# Patient Record
Sex: FEMALE | Race: WHITE | URBAN - METROPOLITAN AREA
[De-identification: names, ages, dates, MRNs, and addresses within clinical notes are randomized per-mention and may not be internally consistent; named-entity substitution may affect disease eponyms.]

---

## 2022-01-01 ENCOUNTER — OFFICE VISIT (OUTPATIENT)
Dept: URBAN - METROPOLITAN AREA CLINIC 54 | Facility: CLINIC | Age: 72
End: 2022-01-01
Payer: MEDICARE

## 2022-01-01 ENCOUNTER — WEB ENCOUNTER (OUTPATIENT)
Dept: URBAN - METROPOLITAN AREA CLINIC 54 | Facility: CLINIC | Age: 72
End: 2022-01-01

## 2022-01-01 ENCOUNTER — TELEPHONE ENCOUNTER (OUTPATIENT)
Dept: URBAN - METROPOLITAN AREA CLINIC 54 | Facility: CLINIC | Age: 72
End: 2022-01-01

## 2022-01-01 ENCOUNTER — OUT OF OFFICE VISIT (OUTPATIENT)
Dept: URBAN - METROPOLITAN AREA MEDICAL CENTER 27 | Facility: MEDICAL CENTER | Age: 72
End: 2022-01-01
Payer: MEDICARE

## 2022-01-01 ENCOUNTER — DASHBOARD ENCOUNTERS (OUTPATIENT)
Age: 72
End: 2022-01-01

## 2022-01-01 ENCOUNTER — LAB OUTSIDE AN ENCOUNTER (OUTPATIENT)
Dept: URBAN - METROPOLITAN AREA CLINIC 54 | Facility: CLINIC | Age: 72
End: 2022-01-01

## 2022-01-01 VITALS
HEART RATE: 75 BPM | HEIGHT: 67 IN | WEIGHT: 238.6 LBS | DIASTOLIC BLOOD PRESSURE: 62 MMHG | BODY MASS INDEX: 37.45 KG/M2 | SYSTOLIC BLOOD PRESSURE: 129 MMHG | TEMPERATURE: 97.6 F

## 2022-01-01 DIAGNOSIS — Z79.01 ANTICOAGULANT LONG-TERM USE: ICD-10-CM

## 2022-01-01 DIAGNOSIS — K76.9 LIVER DISEASE: ICD-10-CM

## 2022-01-01 DIAGNOSIS — D69.6 THROMBOCYTOPENIA: ICD-10-CM

## 2022-01-01 DIAGNOSIS — E61.1 IRON DEFICIENCY: ICD-10-CM

## 2022-01-01 DIAGNOSIS — R19.5 ABNORMAL CONSISTENCY OF STOOL: ICD-10-CM

## 2022-01-01 DIAGNOSIS — K76.6 PORTAL HYPERTENSION: ICD-10-CM

## 2022-01-01 DIAGNOSIS — G93.40 ACUTE ENCEPHALOPATHY: ICD-10-CM

## 2022-01-01 DIAGNOSIS — D50.9 ANEMIA: ICD-10-CM

## 2022-01-01 DIAGNOSIS — I85.00 ESOPHAGEAL VARICES WITHOUT BLEEDING, UNSPECIFIED ESOPHAGEAL VARICES TYPE: ICD-10-CM

## 2022-01-01 LAB
A/G RATIO: 1.7
ACTIN (SMOOTH MUSCLE) ANTIBODY: 6
ALBUMIN: 4.3
ALKALINE PHOSPHATASE: 113
ALT (SGPT): 20
AST (SGOT): 30
BILIRUBIN, TOTAL: 1
BUN/CREATININE RATIO: 17
BUN: 34
CALCIUM: 9.9
CARBON DIOXIDE, TOTAL: 23
CHLORIDE: 100
CREATININE: 2
EGFR: 26
FERRITIN, SERUM: 45
GLOBULIN, TOTAL: 2.6
GLUCOSE: 100
HEMATOCRIT: 49.8
HEMOGLOBIN: 14.5
IMMUNOGLOBULIN A, QN, SERUM: 184
IMMUNOGLOBULIN E, TOTAL: 23
IMMUNOGLOBULIN G, QN, SERUM: 863
IMMUNOGLOBULIN M, QN, SERUM: 95
INR: 1.2
IRON BIND.CAP.(TIBC): 452
IRON SATURATION: 17
IRON: 77
MCH: 24.7
MCHC: 29.1
MCV: 85
MITOCHONDRIAL (M2) ANTIBODY: <20
NRBC: (no result)
PLATELETS: 201
POTASSIUM: 4
PROTEIN, TOTAL: 6.9
PROTHROMBIN TIME: 12.1
RBC: 5.86
RDW: 20.6
SODIUM: 143
UIBC: 375
WBC: 6.6

## 2022-01-01 PROCEDURE — 99223 1ST HOSP IP/OBS HIGH 75: CPT | Performed by: STUDENT IN AN ORGANIZED HEALTH CARE EDUCATION/TRAINING PROGRAM

## 2022-01-01 PROCEDURE — G8427 DOCREV CUR MEDS BY ELIG CLIN: HCPCS | Performed by: STUDENT IN AN ORGANIZED HEALTH CARE EDUCATION/TRAINING PROGRAM

## 2022-01-01 PROCEDURE — 99204 OFFICE O/P NEW MOD 45 MIN: CPT | Performed by: STUDENT IN AN ORGANIZED HEALTH CARE EDUCATION/TRAINING PROGRAM

## 2022-01-01 RX ORDER — VITAMIN A 2400 MCG
1 TABLET CAPSULE ORAL TWICE DAILY
Status: ACTIVE | COMMUNITY

## 2022-01-01 RX ORDER — LACTULOSE 10 G/15ML
30 ML SOLUTION ORAL
Status: ACTIVE | COMMUNITY

## 2022-01-01 RX ORDER — TORSEMIDE 20 MG/1
2 TABLETS TABLET ORAL TWICE DAILY
Status: ACTIVE | COMMUNITY

## 2022-01-01 RX ORDER — METOPROLOL TARTRATE 100 MG/1
1 TABLET WITH FOOD TABLET, FILM COATED ORAL TWICE A DAY
Status: ACTIVE | COMMUNITY

## 2022-01-01 RX ORDER — MAGNESIUM OXIDE 400 MG/1
1 TABLET AS NEEDED TABLET ORAL
Status: ACTIVE | COMMUNITY

## 2022-01-01 RX ORDER — LEVOTHYROXINE SODIUM 25 UG/1
1 TABLET IN THE MORNING ON AN EMPTY STOMACH TABLET ORAL ONCE A DAY
Status: ACTIVE | COMMUNITY

## 2022-01-01 RX ORDER — CLONAZEPAM 0.5 MG/1
1 TABLET TABLET ORAL
Status: ACTIVE | COMMUNITY

## 2022-01-01 RX ORDER — POTASSIUM CHLORIDE 1.5 G/1
1 PACKET WITH FOOD POWDER, FOR SOLUTION ORAL ONCE A DAY
Status: ACTIVE | COMMUNITY

## 2022-01-01 RX ORDER — APIXABAN 5 MG/1
1 TABLET TABLET, FILM COATED ORAL TWICE DAILY
Status: ACTIVE | COMMUNITY

## 2022-01-01 RX ORDER — PANTOPRAZOLE SODIUM 40 MG/1
1 TABLET TABLET, DELAYED RELEASE ORAL ONCE A DAY
Status: ACTIVE | COMMUNITY

## 2022-01-01 RX ORDER — SODIUM, POTASSIUM,MAG SULFATES 17.5-3.13G
177 ML SOLUTION, RECONSTITUTED, ORAL ORAL AS DIRECTED
Qty: 177 MILLILITER | Refills: 0 | OUTPATIENT
Start: 2022-01-01 | End: 2022-01-01

## 2022-01-01 RX ORDER — DILTIAZEM HYDROCHLORIDE 360 MG/1
1 CAPSULE CAPSULE, COATED, EXTENDED RELEASE ORAL ONCE A DAY
Status: ACTIVE | COMMUNITY

## 2022-04-21 NOTE — EXAM-PHYSICAL EXAM
General: Well appearing. No acute distress. HEENT: Anicteric sclerae Cardiovascular: Lower extremity edema 1+ bilaterally Respiratory: Breathing comfortably without conversational dyspnea Abdomen:  Obese pannus, non-tender Rectal: Deferred Skin: Mild palmar erythema. No obvious telangiectasia. No jaundice Musculoskeletal: Difficulty ambulating (balance and transferring to examination table) Neuro: No gross focal deficits. Alert and oriented. Psych: Appropriate mood and affect.

## 2022-04-21 NOTE — HPI-TODAY'S VISIT:
Ms. Mcclain is a 72 yoF with a history with a history of CHF seen today for hospital follow up. She was admitted to [facility] in March with HF exacerbation and acute renal failure and acute encephalopathy with an ammonia level of 80 (nl <35) and was started on lactulose with improvement in mentation.  CT A/P w/ IVC 3/16/22 with s/o portal HTN (paraesophageal varices, trace ascites). Labs significant for iron 19 (1/2022) since improved to 39 (2/2022), persistently low iron saturation (11 in 2/2022) . No anemia. Most recent platelets in April 135.   Viral hepatitis work up was negative  Patient denies history heavy alcohol use. She drinks wine on occasion. Intermittent elevation in bilirubin (peak 2.4 in 3/20/22) AST and ALT  ( no more than 2x ULN)   Patient has had INRs persistently elevated in the setting of warfarin use. Patient recently switched to eliquis (?last month). Most recent INR 1.84  Denies family history of liver disease or autoimmune disease. She denies any further episodes of confusion.

## 2022-04-22 PROBLEM — 415116008: Status: ACTIVE | Noted: 2022-01-01

## 2022-04-22 PROBLEM — 235856003: Status: ACTIVE | Noted: 2022-01-01

## 2022-04-22 PROBLEM — 14223005: Status: ACTIVE | Noted: 2022-01-01

## 2022-04-22 PROBLEM — 34742003: Status: ACTIVE | Noted: 2022-01-01

## 2022-04-22 PROBLEM — 81308009: Status: ACTIVE | Noted: 2022-01-01
